# Patient Record
(demographics unavailable — no encounter records)

---

## 2025-01-27 NOTE — PLAN
[FreeTextEntry1] : A/P: Patient in optimal medical condition for stated procedure. Advised to avoid Aspirin and NSAIDS 1 week before surgery. PSTs reviewed, to repeat CBC for WBC counts. Early ambulation and adequate DVT prophylaxis perioperatively as per protocol. Will benefit from perioperative nebulizers and incentive spirometry. Please call with any questions.  DM: Well-controlled, currently on Ozempic, to be monitored closely perioperatively and coverage with insulin as needed.  She will hold Ozempic 1 week before procedure. Hypothyroidism: Stable on levothyroxine 125 mcg daily, to continue with same HTN:  Stable, advised strict low salt diet, daily regular exercise, to c/w meds, bmp to be monitored closely. Mild elevation of alkaline phosphatase: Patient is asymptomatic, was advised ultrasound abdomen which is pending. Follow-up after procedure.

## 2025-01-27 NOTE — ADDENDUM
[FreeTextEntry1] : Repeat CBC shows persistent leukocytosis with neutrophil elevation, needs further workup including UA, chest x-ray. Patient not optimized for surgery at this time.

## 2025-01-27 NOTE — HISTORY OF PRESENT ILLNESS
[(Patient denies any chest pain, claudication, dyspnea on exertion, orthopnea, palpitations or syncope)] : Patient denies any chest pain, claudication, dyspnea on exertion, orthopnea, palpitations or syncope [No Adverse Anesthesia Reaction] : no adverse anesthesia reaction in self or family member [Diabetes] : diabetes [Aortic Stenosis] : no aortic stenosis [Atrial Fibrillation] : no atrial fibrillation [Coronary Artery Disease] : no coronary artery disease [Recent Myocardial Infarction] : no recent myocardial infarction [Asthma] : no asthma [COPD] : no COPD [Sleep Apnea] : no sleep apnea [Smoker] : not a smoker [Chronic Anticoagulation] : no chronic anticoagulation [Chronic Kidney Disease] : no chronic kidney disease [FreeTextEntry1] : Right Knee Replacement [FreeTextEntry2] : 01/28/25 [FreeTextEntry3] : Dr. Ji [FreeTextEntry4] : Pt with a h/o HTN - stable on meds , denies any chest pain or sob hyperlipidemia - on strict dietary mod , no meds Hypothyroidism - taking meds as advised DM - on diet mod ,taking ozempic as advised. denies any hypoglycemia Morbid Obesity - has been trying to watch diet and exercise -has lost weight on Ozempic now OA Knees: was seen by ortho -will be going for right knee replacement is ambulatory with a cane  here for medical clearance. Had shoulder xrays - OA Had mammogram/ breast US - R breast asymmetry 09/2024-to repeat mammogram in 6 months.

## 2025-03-06 NOTE — HISTORY OF PRESENT ILLNESS
[Chills] : no chills [Constipation] : no constipation [Diarrhea] : no diarrhea [Dysuria] : no dysuria [Fever] : no fever [Nausea] : no nausea [Vomiting] : no vomiting [de-identified] :   Right total knee arthroplasty with Mendez. DOS 2/7/25 [de-identified] : 60 year old female  presents to office about 4 weeks  status post R TKA, DOS 2/7/25. Overall the patient is doing well. States pain is well controlled. Has been taking Tylenol and Oxycodone for pain. Ambulating with the use of a walker. Has been taking ASA for DVT prophylaxis as directed. Has been participating in physical therapy. Denies any recent injuries, falls, trauma, incisional issues, erythema/drainage/discharge, chest pain, shortness of breath, swelling, calf tenderness, neurovascular compromise.  Of note patient accompanied by family ember who is providing translation. [de-identified] : right lower extremity: Incision well-healed without erythema drainage or discharge, knee range of motion 0-100 limited by pain, no instability varus valgus stress, negative anterior drawer, 5 out of 5 strength in foot plantar flexion and dorsiflexion, nontender palpation over the calf, mild swelling noted. [de-identified] : 3 views of the right  knee taken in office today show no acute fracture or dislocation. There is a right  total knee arthroplasty in appropriate alignment with no evidence of loosening or hardware compromise. [de-identified] : 60-year-old female presents to the office about 4 weeks status post right total knee arthroplasty, date of surgery 2/7/2025.  Overall the patient is doing very well.  She has expressed her satisfaction with her progress and decision to have surgery.  I recommend she continue with outpatient physical therapy and provided referral for that today.  She may continue to take Tylenol and oxycodone as needed for pain.  She should continue her aspirin for DVT prophylaxis as directed.  The patient should follow-up in 3 weeks for repeat evaluation and imaging or sooner if any issues arise.  All questions were addressed with the patient and her family member who provided translation.  Patient in agreement.

## 2025-03-24 NOTE — ADDENDUM
[FreeTextEntry1] : This note was written by Zahra Marques, acting as the  for Dr. Ji on 03/24/2025. This note accurately reflects the work and decisions made by Dr. Ji.

## 2025-03-24 NOTE — HISTORY OF PRESENT ILLNESS
[___ Weeks Post Op] : [unfilled] weeks post op [3] : the patient reports pain that is 3/10 in severity [Xray (Date:___)] : [unfilled] Xray -  [Doing Well] : is doing well [Excellent Pain Control] : has excellent pain control [No Sign of Infection] : is showing no signs of infection [Chills] : no chills [Diarrhea] : no diarrhea [Dysuria] : no dysuria [Fever] : no fever [Nausea] : no nausea [Vomiting] : no vomiting [de-identified] :   Right total knee arthroplasty with Mendez. DOS 2/7/25 [de-identified] : 60 year old female  presents to office about 6 weeks  status post R TKA, DOS 2/7/25. Overall the patient is doing well. States pain is well controlled. Reports having radiating intermittent pain and stiffness. Has been taking Tylenol for pain. The oxycodone caused constipation, so the patient decided to stop intake. Ambulating with the use of a cane. Has been taking ASA for DVT prophylaxis as directed. Has been participating in physical. Denies any recent injuries, falls, trauma, incisional issues, erythema/drainage/discharge, chest pain, shortness of breath, swelling, calf tenderness, neurovascular compromise.  Of note patient accompanied by family ember who is providing translation. [de-identified] : right lower extremity: Incision well-healed without erythema drainage or discharge, knee range of motion 0-110 limited by pain, no instability varus valgus stress, negative anterior drawer, 5 out of 5 strength in foot plantar flexion and dorsiflexion, nontender palpation over the calf, mild swelling noted. [de-identified] : 3 views of the right knee taken in office today show no acute fractures or dislocations. There is a right total knee arthroplasty in appropriate alignment without evidence of loosening or hardware compromise.   [de-identified] : Patient is a 60-year-old female here today for follow-up now 6-week status post right total knee arthroplasty with Mendez.  Overall she doing extremely well.  I recommend she continue with low impact activity exercise.  A prescription for Celebrex was provided.  Continue with low impact activity exercise.  I will see her back in 6 weeks for repeat evaluation.  All questions were asked and answered

## 2025-05-12 NOTE — HISTORY OF PRESENT ILLNESS
[de-identified] : 60 year old female  presents to office about 12 weeks  status post R TKA, DOS 2/7/25. Overall the patient is doing well. States pain is well controlled. Reports having numbness that radiates throughout the leg. Has been taking Tylenol for pain. The oxycodone caused constipation, so the patient decided to stop intake. Ambulating without the use of a device. Has been taking ASA for DVT prophylaxis as directed. Completed physical therapy sessions. Denies any recent injuries, falls, trauma, incisional issues, erythema/drainage/discharge, chest pain, shortness of breath, swelling, calf tenderness, neurovascular compromise.  Of note patient accompanied by family member who is providing translation. Would like to have the left knee done

## 2025-05-12 NOTE — DISCUSSION/SUMMARY
[Medication Risks Reviewed] : Medication risks reviewed [Surgical risks reviewed] : Surgical risks reviewed [de-identified] : Patient is a 60 year old F here today for follow-up of severe left knee osteoarthritis. The patient has tried extensive conservative treatments in the past including directed physical therapy, activity modification, NSAID use, injections (last injection 3 months ago), and has failed to have relief of the pain in her knee. Due to the fact that they have and tried exhausted conservative treatment, the patient wishes to proceed with a total knee arthroplasty with regan, and I do think that is indicated. We discussed the risk and benefit alternatives including but not limited to blood loss, infection, damage to neurovascular structures risk, need for revision surgery risk of periprosthetic fracture. Patient is in agreement and wishes to proceed.  We will obtain a CT scan for Regan planning. We will obtain medical clearance. The patient should follow up post operatively for repeat evaluation. All questions were addressed. The patient verbalized understanding and is in agreement with the plan.  As for her right knee she is doing very well now 3-month status post right total knee arthroplasty with Regan.  She can continue with low-impact activity exercise.  She will continue take Tylenol as needed for any pain.  I will see her back in 1 year.  All questions were asked and answered The patient is a 60 year old female who has severe osteoarthritis of the left knee. Based upon the patient's continued symptoms and failure to respond to conservative treatment I have recommended a left total knee replacement for the patient. A discussion was had with the patient regarding a left total knee replacement. A long discussion was had with the patient as what the total joint replacement would entail. A model was used to demonstrate the operation and to discuss bearing surfaces of the implants. The hospitalization and rehabilitation were discussed. The use of perioperative antibiotics and DVT prophylaxis were discussed. The risks, benefits and alternatives to surgical intervention were discussed at length with the patient. Specific risks discussed included: infection, wound breakdown, numbness and damage to nerves, tendon, muscle, arteries or other blood vessels. The possibility of recurrent pain, no improvement in pain and actual worsening of the pain were also mentioned in conversation with the patient. Medical complications related to the patient's general medical health including deep vein thrombosis, pulmonary embolus, heart attack, stroke, death and other complications from anesthesia were discussed as well. The patient was told that we will take steps to minimize these risks by using sterile technique, antibiotics and DVT prophylaxis when appropriate and following the patient postoperatively in the clinic setting to monitor progress. The benefits of surgery were discussed with the patient including the potential to improve the current clinical condition through operative intervention. Alternatives to surgical intervention include continued conservative management which may yield less than optimal results in this particular patient. All questions were answered to the satisfaction of the patient. We did discuss implant choices and fixation, with shared decision making with the patient.   We discussed that the knee replacement will be done with robotic assistance to enhance accuracy and dynamic joint balancing.

## 2025-05-12 NOTE — DISCUSSION/SUMMARY
[Medication Risks Reviewed] : Medication risks reviewed [Surgical risks reviewed] : Surgical risks reviewed [de-identified] : Patient is a 60 year old F here today for follow-up of severe left knee osteoarthritis. The patient has tried extensive conservative treatments in the past including directed physical therapy, activity modification, NSAID use, injections (last injection 3 months ago), and has failed to have relief of the pain in her knee. Due to the fact that they have and tried exhausted conservative treatment, the patient wishes to proceed with a total knee arthroplasty with regan, and I do think that is indicated. We discussed the risk and benefit alternatives including but not limited to blood loss, infection, damage to neurovascular structures risk, need for revision surgery risk of periprosthetic fracture. Patient is in agreement and wishes to proceed.  We will obtain a CT scan for Regan planning. We will obtain medical clearance. The patient should follow up post operatively for repeat evaluation. All questions were addressed. The patient verbalized understanding and is in agreement with the plan.  As for her right knee she is doing very well now 3-month status post right total knee arthroplasty with Regan.  She can continue with low-impact activity exercise.  She will continue take Tylenol as needed for any pain.  I will see her back in 1 year.  All questions were asked and answered The patient is a 60 year old female who has severe osteoarthritis of the left knee. Based upon the patient's continued symptoms and failure to respond to conservative treatment I have recommended a left total knee replacement for the patient. A discussion was had with the patient regarding a left total knee replacement. A long discussion was had with the patient as what the total joint replacement would entail. A model was used to demonstrate the operation and to discuss bearing surfaces of the implants. The hospitalization and rehabilitation were discussed. The use of perioperative antibiotics and DVT prophylaxis were discussed. The risks, benefits and alternatives to surgical intervention were discussed at length with the patient. Specific risks discussed included: infection, wound breakdown, numbness and damage to nerves, tendon, muscle, arteries or other blood vessels. The possibility of recurrent pain, no improvement in pain and actual worsening of the pain were also mentioned in conversation with the patient. Medical complications related to the patient's general medical health including deep vein thrombosis, pulmonary embolus, heart attack, stroke, death and other complications from anesthesia were discussed as well. The patient was told that we will take steps to minimize these risks by using sterile technique, antibiotics and DVT prophylaxis when appropriate and following the patient postoperatively in the clinic setting to monitor progress. The benefits of surgery were discussed with the patient including the potential to improve the current clinical condition through operative intervention. Alternatives to surgical intervention include continued conservative management which may yield less than optimal results in this particular patient. All questions were answered to the satisfaction of the patient. We did discuss implant choices and fixation, with shared decision making with the patient.   We discussed that the knee replacement will be done with robotic assistance to enhance accuracy and dynamic joint balancing.

## 2025-05-12 NOTE — PHYSICAL EXAM
[de-identified] : right lower extremity: Incision well-healed without erythema drainage or discharge, knee range of motion 0-110 limited by pain, no instability varus valgus stress, negative anterior drawer, 5 out of 5 strength in foot plantar flexion and dorsiflexion, nontender palpation over the calf, mild swelling noted. [de-identified] : 3 views of the right knee taken in office today show no acute fractures or dislocations. There is a right total knee arthroplasty in appropriate alignment without evidence of loosening or hardware compromise.

## 2025-05-12 NOTE — PHYSICAL EXAM
[de-identified] : right lower extremity: Incision well-healed without erythema drainage or discharge, knee range of motion 0-110 limited by pain, no instability varus valgus stress, negative anterior drawer, 5 out of 5 strength in foot plantar flexion and dorsiflexion, nontender palpation over the calf, mild swelling noted. [de-identified] : 3 views of the right knee taken in office today show no acute fractures or dislocations. There is a right total knee arthroplasty in appropriate alignment without evidence of loosening or hardware compromise.

## 2025-05-12 NOTE — HISTORY OF PRESENT ILLNESS
[de-identified] : 60 year old female  presents to office about 12 weeks  status post R TKA, DOS 2/7/25. Overall the patient is doing well. States pain is well controlled. Reports having numbness that radiates throughout the leg. Has been taking Tylenol for pain. The oxycodone caused constipation, so the patient decided to stop intake. Ambulating without the use of a device. Has been taking ASA for DVT prophylaxis as directed. Completed physical therapy sessions. Denies any recent injuries, falls, trauma, incisional issues, erythema/drainage/discharge, chest pain, shortness of breath, swelling, calf tenderness, neurovascular compromise.  Of note patient accompanied by family member who is providing translation. Would like to have the left knee done

## 2025-05-12 NOTE — ADDENDUM
[FreeTextEntry1] : This note was written by Zahra Marques, acting as the  for Dr. Ji on 05/12/2025. This note accurately reflects the work and decisions made by Dr. Ji.

## 2025-06-13 NOTE — HISTORY OF PRESENT ILLNESS
[No Pertinent Cardiac History] : no history of aortic stenosis, atrial fibrillation, coronary artery disease, recent myocardial infarction, or implantable device/pacemaker [No Pertinent Pulmonary History] : no history of asthma, COPD, sleep apnea, or smoking [No Adverse Anesthesia Reaction] : no adverse anesthesia reaction in self or family member [FreeTextEntry1] : Left Knee Replacement [FreeTextEntry2] : 6/27/25 [FreeTextEntry3] : Dr. Brando Ji [FreeTextEntry4] : 60 year old female with multiple comorbidities s/p R. Knee replacement now presenting to the office for pre-op clearance for L. Knee Replacement with Dr. Ji on 6/27/25 HTN - stable on meds , denies any chest pain or sob hyperlipidemia - on strict dietary mod , no meds Hypothyroidism - taking meds as advised DM - on diet mod ,taking ozempic as advised. denies any hypoglycemia Morbid Obesity - has been trying to watch diet and exercise -has lost weight on Ozempic now OA Knees: was seen by ortho -will be going for right knee replacement is ambulatory with a cane Had shoulder xrays - OA Had mammogram/ breast US - R breast asymmetry 09/2024-to repeat mammogram in 6 months.

## 2025-06-13 NOTE — PLAN
[FreeTextEntry1] : A/P: Patient in optimal medical condition for stated procedure. Advised to avoid Aspirin and NSAIDS 1 week before surgery. PSTs reviewed, WBC elevated - Repeat Urinalysis/UCx, if positive will treat prior to surgery Early ambulation and adequate DVT prophylaxis perioperatively as per protocol. Will benefit from perioperative nebulizers and incentive spirometry. Please call with any questions.  DM: Well-controlled, currently on Ozempic, to be monitored closely perioperatively and coverage with insulin as needed. She will hold Ozempic 1 week before procedure. Hypothyroidism: Stable on levothyroxine 125 mcg daily, to continue with same HTN:  Stable, advised strict low salt diet, daily regular exercise, to c/w meds, bmp to be monitored closely. Follow-up after procedure  F/U: 1 month post procedure

## 2025-07-17 NOTE — HISTORY OF PRESENT ILLNESS
[Doing Well] : is doing well [No Sign of Infection] : is showing no signs of infection [Adequate Pain Control] : has adequate pain control [de-identified] : S/P: Left total knee arthroplasty with CHRISTOPH. DOS: 6/27/2025 [de-identified] : 61yo female follows up s/p left TKA with CHRISTOPH, DOS 6/27/25. Patient reports she is doing well, but feels pain when bending the knee. She has completed in home PT and is to start outpatient. She denies any falls or incisional issues. She ambulates with a cane.  [de-identified] : TKA:  Left Knee Exam: Incision well-healed without erythema drainage or discharge, knee range of motion is from 0-110 limited by pain, no instability varus valgus stress, negative anterior drawer, 5 out of 5 strength in foot plantar flexion and dorsiflexion, nontender palpation over the calf, no significant swelling noted.  [de-identified] : 3 views of the left  knee taken in office today show no acute fractures or dislocations. There is a left  total knee arthroplasty in appropriate alignment without evidence of loosening or hardware compromise.  [de-identified] : 59yo female follows up s/p left TKA with CHRISTOPH, DOS 6/27/25. Overall, patient appears well. I recommend outpatient PT. Continue pain control PRN and DVT ppx as directed. Follow up 3 weeks. All questions asked and answered.